# Patient Record
Sex: MALE | Race: WHITE | NOT HISPANIC OR LATINO
[De-identification: names, ages, dates, MRNs, and addresses within clinical notes are randomized per-mention and may not be internally consistent; named-entity substitution may affect disease eponyms.]

---

## 2021-05-05 ENCOUNTER — APPOINTMENT (OUTPATIENT)
Dept: CARDIOLOGY | Facility: CLINIC | Age: 51
End: 2021-05-05

## 2021-06-02 ENCOUNTER — APPOINTMENT (OUTPATIENT)
Dept: CARDIOLOGY | Facility: CLINIC | Age: 51
End: 2021-06-02
Payer: COMMERCIAL

## 2021-06-02 ENCOUNTER — NON-APPOINTMENT (OUTPATIENT)
Age: 51
End: 2021-06-02

## 2021-06-02 VITALS — HEART RATE: 105 BPM | WEIGHT: 315 LBS | BODY MASS INDEX: 44.1 KG/M2 | OXYGEN SATURATION: 98 % | HEIGHT: 71 IN

## 2021-06-02 VITALS — SYSTOLIC BLOOD PRESSURE: 138 MMHG | DIASTOLIC BLOOD PRESSURE: 98 MMHG

## 2021-06-02 PROCEDURE — 99072 ADDL SUPL MATRL&STAF TM PHE: CPT

## 2021-06-02 PROCEDURE — 99243 OFF/OP CNSLTJ NEW/EST LOW 30: CPT

## 2021-06-02 PROCEDURE — 93000 ELECTROCARDIOGRAM COMPLETE: CPT

## 2021-06-02 NOTE — DISCUSSION/SUMMARY
[FreeTextEntry1] : 51 year old man with HTN, persistent atrial fibrillation (on AC) presenting for first time to office after referal from primary cardiologist in setting of decompensated heart failure. \par \par \par HF- likely preserved given risk factors of atrial fibrillation, obesity, HTN however no TTE here. \par Patient euvolemic on exam today with JVP of 8. Counseled patient on relationship of HFpEF and metabolic risk factors as well as atrial fibrillation.\par -continue with torsemide 60 mg daily which patient is responding to \par -would like to obtain TTE however plan for patient to have EP evaluation with Dr. Fair with likely plan for eventual JUN/cardioversion. \par -based on JUN and heart function, will consider ischemic evaluation but would like to address atrial fibrillation first. \par -plan related to patient and discussed with Dr. Huddlseton \par \par Atrial fibrillation- persistent \par Chads VASC 2. No symptoms at this time but is tachycardiac in atrial fibrillation. \par Counseled patient on adverse outcomes of persistent long term a fib. He reports he has never had a cardioversion. \par -plan for EP evaluation within week, likely with plan for cardioversion and if not durable, than ablation. \par -plan discussed with patient at length\par \par Patient to return to office in 2 months for evaluation.

## 2021-06-02 NOTE — REASON FOR VISIT
[Symptom and Test Evaluation] : symptom and test evaluation [FreeTextEntry3] : Dr. Huddleston  [FreeTextEntry1] : Refered for recent decompensated heart failure admission

## 2021-06-02 NOTE — HISTORY OF PRESENT ILLNESS
[FreeTextEntry1] : Santosh Abraham is a 51 year old man with HTN, persistent atrial fibrillation (on AC) who presents for first time evaluation after recent complicated hospital stay. He states he was in his usual state of health until November 2020, which is when he felt URI symptoms. At that time, he noticed that his right testicle swollen and went to [facility]. It never resolved with conservative measures upon discharge from the ER. After his discharge, he states he had blood in the urine, went back to ER, and was readmitted to [facility]. Was found to be in decompensated heart failure at that time. Met a cardiologist Dr. Peng, cardiologist. Recommended Dr. Huddleston after hospital stay. \par Chest x-ray and blood work was done. Did TTE at [facility] but he is unaware of the results. \par No chest pain No shortness of breath. He feels that his energy level has increased since his hospital stay. 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 \par \par Medications\par - losartan \par -apixaban 5 mg \par -diltiazem 240 mg \par -metoprolol 100 mg daily\par -spironolactone 25 mg \par -torsemide 20 mg x 3 \par -losartan 25 mg daily \par  [Persistent Atrial Fibrillation] : persistent atrial fibrillation

## 2021-06-02 NOTE — PHYSICAL EXAM
[Well Developed] : well developed [Well Nourished] : well nourished [Ill-Appearing] : ill-appearing [Normal Conjunctiva] : normal conjunctiva [No Xanthelasma] : no xanthelasma [Normal Venous Pressure] : normal venous pressure [Normal S1, S2] : normal S1, S2 [No Murmur] : no murmur [No Edema] : no edema [Normal Radial B/L] : normal radial B/L

## 2021-06-07 ENCOUNTER — NON-APPOINTMENT (OUTPATIENT)
Age: 51
End: 2021-06-07

## 2021-06-07 ENCOUNTER — APPOINTMENT (OUTPATIENT)
Dept: ELECTROPHYSIOLOGY | Facility: CLINIC | Age: 51
End: 2021-06-07
Payer: COMMERCIAL

## 2021-06-07 VITALS
WEIGHT: 315 LBS | DIASTOLIC BLOOD PRESSURE: 81 MMHG | SYSTOLIC BLOOD PRESSURE: 127 MMHG | HEIGHT: 71 IN | HEART RATE: 89 BPM | BODY MASS INDEX: 44.1 KG/M2 | OXYGEN SATURATION: 97 %

## 2021-06-07 PROCEDURE — 99205 OFFICE O/P NEW HI 60 MIN: CPT

## 2021-06-07 PROCEDURE — 93000 ELECTROCARDIOGRAM COMPLETE: CPT

## 2021-06-07 PROCEDURE — 99072 ADDL SUPL MATRL&STAF TM PHE: CPT

## 2021-06-07 NOTE — DISCUSSION/SUMMARY
[FreeTextEntry1] : In summary, this is a 51 year old man with CHADSVASC 2 late persistent atrial fibrillation in the setting of morbid obesity, HTN and decompensated heart failure managed on high dose beta blockers and calcium channel blockers. Although he feels he is overtly asymptomatic, we are highly suspicious that he will feel significantly better in sinus rhythm. Options regarding management discussed, including rate control strategy with AV manju blocking agents, or rhythm control strategies employing antiarrhythmic drugs and/or catheter ablation were reviewed. We discussed the importance of maintaining sinus rhythm in the setting of LV dysfunction and recommend he undergo a rhythm control strategy. At this point, would suggest he be admitted for Sotalol initiation followed by DCCV on Sotalol. He does occasionally miss his doses of Eliquis and will require a JUN prior to cardioversion. We also discussed that he may undergo JUN/DCCV without Sotalol, however given the fact that he has likely been in persistent AF since at least 11/2020, the chance of success of the DCCV is low. \par \par We also discussed that overtime, if he can continue lifestyle modifications and lose a significant amount of weight so we can consider a more aggressive upfront management strategy employing an ablation procedure in an effort to reduce risk of worsening HF and subsequent hospitalizations.\par \par Routine blood work has been ordered today to evaluate thyroid function in the setting of AF and obtain baseline CBC/renal function should he decide to proceed with Sotalol initiation.\par  \par Mr. Abraham would like to consider his options and call our office with his decision of how he would like to proceed with management. \par \par Mr. Abraham appeared to understand the whole discussion and verbalized that all of his questions were answered to her satisfaction.\par \par Thank you for allowing me to be involved in the care of this pleasant woman. Please feel free to contact me with any questions.\par  \par \par

## 2021-06-07 NOTE — HISTORY OF PRESENT ILLNESS
[FreeTextEntry1] : Referring Physician: Dr. Alexi Huddleston and DR. Pramod Kimball\par \par Dear Khris Huddleston and Jigar,\par \par Mr. Abraham was seen in the [facility] today. For our records, please allow me to summarize the history and my findings.\par \par This pleasant 51 year old man has a cardiovascular history significant for tobacco use (quit 12-13 years ago) morbid obesity, HTN, and persistent atrial fibrillation. He states he was in his usual state of health until 11/2020 when he presented to [place] ER for one week of R testicular swelling. It unfortunately did not resolve with conservative measures upon discharge from the ER. After his discharge, he states he had blood in the urine, went back to ER, and was readmitted to [place]. At that time he was found to be in decompensated heart failure. AF was diagnosed at the time and he was initiated on oral anticoagulation. Reports he had lab work, CXR, and TTE but is unsure of the results. \par \par In retrospect, a few weeks prior to admission, he had a "bad" upper respiratory infection and reports feeling sluggish for a few weeks afterwards. \par \par He is currently maintained on Metoprolol tartrate 150mg BID, Diltiazem 240mg QD and Eliquis 5mg. \par \par He drinks a few glasses of wine daily. He is a former smoker (quit 12-13 years ago) and drinks one caffeinated cup of coffee per day. \par \par Reports good exercise tolerance and reports improved eating habits and some weight loss since hospitalization. He is able to walk 1.5-2miles per day without fatigue. He was told he needs to undergo a sleep study but has not yet scheduled this. Mr. Abraham denies any recent history of chest pain, shortness of breath, palpitations, dizziness, or syncope. \par

## 2021-06-08 LAB
ALBUMIN SERPL ELPH-MCNC: 4.7 G/DL
ALP BLD-CCNC: 81 U/L
ALT SERPL-CCNC: 36 U/L
ANION GAP SERPL CALC-SCNC: 13 MMOL/L
AST SERPL-CCNC: 33 U/L
BASOPHILS # BLD AUTO: 0.04 K/UL
BASOPHILS NFR BLD AUTO: 0.4 %
BILIRUB SERPL-MCNC: 0.7 MG/DL
BUN SERPL-MCNC: 31 MG/DL
CALCIUM SERPL-MCNC: 9.6 MG/DL
CHLORIDE SERPL-SCNC: 99 MMOL/L
CO2 SERPL-SCNC: 26 MMOL/L
CREAT SERPL-MCNC: 1.53 MG/DL
EOSINOPHIL # BLD AUTO: 0.14 K/UL
EOSINOPHIL NFR BLD AUTO: 1.5 %
GLUCOSE SERPL-MCNC: 112 MG/DL
HCT VFR BLD CALC: 48 %
HGB BLD-MCNC: 16.4 G/DL
IMM GRANULOCYTES NFR BLD AUTO: 0.6 %
LYMPHOCYTES # BLD AUTO: 1.15 K/UL
LYMPHOCYTES NFR BLD AUTO: 12 %
MAN DIFF?: NORMAL
MCHC RBC-ENTMCNC: 31.1 PG
MCHC RBC-ENTMCNC: 34.2 GM/DL
MCV RBC AUTO: 90.9 FL
MONOCYTES # BLD AUTO: 0.64 K/UL
MONOCYTES NFR BLD AUTO: 6.7 %
NEUTROPHILS # BLD AUTO: 7.54 K/UL
NEUTROPHILS NFR BLD AUTO: 78.8 %
PLATELET # BLD AUTO: 185 K/UL
POTASSIUM SERPL-SCNC: 4.6 MMOL/L
PROT SERPL-MCNC: 7.8 G/DL
RBC # BLD: 5.28 M/UL
RBC # FLD: 12.7 %
SODIUM SERPL-SCNC: 139 MMOL/L
TSH SERPL-ACNC: 2.23 UIU/ML
WBC # FLD AUTO: 9.57 K/UL

## 2022-10-18 ENCOUNTER — NON-APPOINTMENT (OUTPATIENT)
Age: 52
End: 2022-10-18

## 2022-10-18 DIAGNOSIS — Z86.39 PERSONAL HISTORY OF OTHER ENDOCRINE, NUTRITIONAL AND METABOLIC DISEASE: ICD-10-CM

## 2022-10-18 DIAGNOSIS — Z78.9 OTHER SPECIFIED HEALTH STATUS: ICD-10-CM

## 2022-10-18 DIAGNOSIS — Z87.891 PERSONAL HISTORY OF NICOTINE DEPENDENCE: ICD-10-CM

## 2022-10-18 DIAGNOSIS — Z82.49 FAMILY HISTORY OF ISCHEMIC HEART DISEASE AND OTHER DISEASES OF THE CIRCULATORY SYSTEM: ICD-10-CM

## 2022-10-18 DIAGNOSIS — Z83.3 FAMILY HISTORY OF DIABETES MELLITUS: ICD-10-CM

## 2022-12-08 RX ORDER — APIXABAN 5 MG/1
5 TABLET, FILM COATED ORAL
Refills: 0 | Status: DISCONTINUED | COMMUNITY
End: 2022-12-08

## 2023-01-12 ENCOUNTER — NON-APPOINTMENT (OUTPATIENT)
Age: 53
End: 2023-01-12

## 2023-01-12 ENCOUNTER — APPOINTMENT (OUTPATIENT)
Dept: CARDIOLOGY | Facility: CLINIC | Age: 53
End: 2023-01-12
Payer: COMMERCIAL

## 2023-01-12 VITALS
WEIGHT: 315 LBS | OXYGEN SATURATION: 98 % | DIASTOLIC BLOOD PRESSURE: 83 MMHG | HEART RATE: 71 BPM | TEMPERATURE: 98.1 F | SYSTOLIC BLOOD PRESSURE: 128 MMHG | HEIGHT: 71 IN | BODY MASS INDEX: 44.1 KG/M2

## 2023-01-12 PROCEDURE — 99213 OFFICE O/P EST LOW 20 MIN: CPT | Mod: 25

## 2023-01-12 PROCEDURE — 93000 ELECTROCARDIOGRAM COMPLETE: CPT

## 2023-01-12 RX ORDER — RIVAROXABAN 20 MG/1
20 TABLET, FILM COATED ORAL
Refills: 0 | Status: DISCONTINUED | COMMUNITY
End: 2023-01-12

## 2023-01-12 RX ORDER — MAGNESIUM OXIDE 241.3 MG/1000MG
400 TABLET ORAL TWICE DAILY
Refills: 0 | Status: DISCONTINUED | COMMUNITY
End: 2023-01-12

## 2023-01-12 NOTE — ASSESSMENT
[FreeTextEntry1] : 52 years old male with atrial fibrillation CHF marked obesity and sleep apnea comes to the office for follow-up.  Patient was advised to see Dr. Alvarado for pulmonary evaluation and evaluation for sleep apnea

## 2023-01-12 NOTE — REASON FOR VISIT
[Hyperlipidemia] : hyperlipidemia [Hypertension] : hypertension [Other: ____] : [unfilled] [FreeTextEntry1] : 52 years old male with arteriosclerotic heart disease atrial fibrillation CHF, obesity and sleep apnea comes to the office for routine follow up. denies chest pain-has had shortness of breath on mild-to-moderate exertion

## 2023-04-13 ENCOUNTER — APPOINTMENT (OUTPATIENT)
Dept: CARDIOLOGY | Facility: CLINIC | Age: 53
End: 2023-04-13

## 2023-07-07 ENCOUNTER — NON-APPOINTMENT (OUTPATIENT)
Age: 53
End: 2023-07-07

## 2023-07-07 ENCOUNTER — APPOINTMENT (OUTPATIENT)
Dept: CARDIOLOGY | Facility: CLINIC | Age: 53
End: 2023-07-07
Payer: COMMERCIAL

## 2023-07-07 VITALS
WEIGHT: 315 LBS | HEIGHT: 71 IN | SYSTOLIC BLOOD PRESSURE: 139 MMHG | BODY MASS INDEX: 44.1 KG/M2 | OXYGEN SATURATION: 95 % | DIASTOLIC BLOOD PRESSURE: 91 MMHG | HEART RATE: 79 BPM

## 2023-07-07 DIAGNOSIS — I48.19 OTHER PERSISTENT ATRIAL FIBRILLATION: ICD-10-CM

## 2023-07-07 DIAGNOSIS — I25.10 ATHEROSCLEROTIC HEART DISEASE OF NATIVE CORONARY ARTERY W/OUT ANGINA PECTORIS: ICD-10-CM

## 2023-07-07 DIAGNOSIS — Z00.00 ENCOUNTER FOR GENERAL ADULT MEDICAL EXAMINATION W/OUT ABNORMAL FINDINGS: ICD-10-CM

## 2023-07-07 PROCEDURE — 99213 OFFICE O/P EST LOW 20 MIN: CPT | Mod: 25

## 2023-07-07 PROCEDURE — 93000 ELECTROCARDIOGRAM COMPLETE: CPT

## 2023-07-07 NOTE — ASSESSMENT
[FreeTextEntry1] : Patient's medications reviewed..  Patient continue previous medication no changes are made.  Patient was referred to wound care center for chronic wound in the left leg

## 2023-07-07 NOTE — REASON FOR VISIT
[Arrhythmia/ECG Abnorrmalities] : arrhythmia/ECG abnormalities [Hypertension] : hypertension [Other: ____] : [unfilled] [FreeTextEntry1] : 53 years old male with atrial fibrillation CHF hypertension obesity with sleep apnea comes to the office for routine follow-up

## 2023-07-14 ENCOUNTER — OUTPATIENT (OUTPATIENT)
Dept: OUTPATIENT SERVICES | Facility: HOSPITAL | Age: 53
LOS: 1 days | Discharge: HOME | End: 2023-07-14
Payer: COMMERCIAL

## 2023-07-14 ENCOUNTER — APPOINTMENT (OUTPATIENT)
Dept: WOUND CARE | Facility: HOSPITAL | Age: 53
End: 2023-07-14
Payer: COMMERCIAL

## 2023-07-14 VITALS
OXYGEN SATURATION: 97 % | RESPIRATION RATE: 22 BRPM | HEART RATE: 100 BPM | WEIGHT: 315 LBS | TEMPERATURE: 98.5 F | HEIGHT: 71 IN | DIASTOLIC BLOOD PRESSURE: 85 MMHG | SYSTOLIC BLOOD PRESSURE: 139 MMHG | BODY MASS INDEX: 44.1 KG/M2

## 2023-07-14 DIAGNOSIS — L97.801 NON-PRESSURE CHRONIC ULCER OF OTHER PART OF UNSPECIFIED LOWER LEG LIMITED TO BREAKDOWN OF SKIN: ICD-10-CM

## 2023-07-14 DIAGNOSIS — I83.90 ASYMPTOMATIC VARICOSE VEINS OF UNSPECIFIED LOWER EXTREMITY: ICD-10-CM

## 2023-07-14 DIAGNOSIS — Z86.39 PERSONAL HISTORY OF OTHER ENDOCRINE, NUTRITIONAL AND METABOLIC DISEASE: ICD-10-CM

## 2023-07-14 DIAGNOSIS — Z78.9 OTHER SPECIFIED HEALTH STATUS: ICD-10-CM

## 2023-07-14 PROCEDURE — G0463: CPT

## 2023-07-14 PROCEDURE — 99203 OFFICE O/P NEW LOW 30 MIN: CPT

## 2023-07-14 RX ORDER — POTASSIUM CHLORIDE 1500 MG/1
20 TABLET, EXTENDED RELEASE ORAL DAILY
Qty: 90 | Refills: 0 | Status: DISCONTINUED | COMMUNITY
Start: 1900-01-01 | End: 2023-07-14

## 2023-07-17 RX ORDER — FUROSEMIDE 20 MG/1
20 TABLET ORAL TWICE DAILY
Qty: 360 | Refills: 0 | Status: DISCONTINUED | COMMUNITY
End: 2023-07-17

## 2023-07-19 DIAGNOSIS — Z83.3 FAMILY HISTORY OF DIABETES MELLITUS: ICD-10-CM

## 2023-07-19 DIAGNOSIS — I48.19 OTHER PERSISTENT ATRIAL FIBRILLATION: ICD-10-CM

## 2023-07-19 DIAGNOSIS — G47.30 SLEEP APNEA, UNSPECIFIED: ICD-10-CM

## 2023-07-19 DIAGNOSIS — Z79.899 OTHER LONG TERM (CURRENT) DRUG THERAPY: ICD-10-CM

## 2023-07-19 DIAGNOSIS — Z79.01 LONG TERM (CURRENT) USE OF ANTICOAGULANTS: ICD-10-CM

## 2023-07-19 DIAGNOSIS — Z82.49 FAMILY HISTORY OF ISCHEMIC HEART DISEASE AND OTHER DISEASES OF THE CIRCULATORY SYSTEM: ICD-10-CM

## 2023-07-19 DIAGNOSIS — E66.01 MORBID (SEVERE) OBESITY DUE TO EXCESS CALORIES: ICD-10-CM

## 2023-07-19 DIAGNOSIS — I11.0 HYPERTENSIVE HEART DISEASE WITH HEART FAILURE: ICD-10-CM

## 2023-07-19 DIAGNOSIS — I50.9 HEART FAILURE, UNSPECIFIED: ICD-10-CM

## 2023-07-19 DIAGNOSIS — L97.829 NON-PRESSURE CHRONIC ULCER OF OTHER PART OF LEFT LOWER LEG WITH UNSPECIFIED SEVERITY: ICD-10-CM

## 2023-07-19 DIAGNOSIS — I83.228 VARICOSE VEINS OF LEFT LOWER EXTREMITY WITH BOTH ULCER OF OTHER PART OF LOWER EXTREMITY AND INFLAMMATION: ICD-10-CM

## 2023-07-21 ENCOUNTER — OUTPATIENT (OUTPATIENT)
Dept: OUTPATIENT SERVICES | Facility: HOSPITAL | Age: 53
LOS: 1 days | Discharge: HOME | End: 2023-07-21
Payer: COMMERCIAL

## 2023-07-21 ENCOUNTER — APPOINTMENT (OUTPATIENT)
Dept: WOUND CARE | Facility: HOSPITAL | Age: 53
End: 2023-07-21
Payer: COMMERCIAL

## 2023-07-21 VITALS
HEIGHT: 71 IN | DIASTOLIC BLOOD PRESSURE: 79 MMHG | TEMPERATURE: 98 F | WEIGHT: 315 LBS | OXYGEN SATURATION: 94 % | RESPIRATION RATE: 20 BRPM | SYSTOLIC BLOOD PRESSURE: 123 MMHG | HEART RATE: 90 BPM | BODY MASS INDEX: 44.1 KG/M2

## 2023-07-21 DIAGNOSIS — Z79.01 LONG TERM (CURRENT) USE OF ANTICOAGULANTS: ICD-10-CM

## 2023-07-21 DIAGNOSIS — I11.0 HYPERTENSIVE HEART DISEASE WITH HEART FAILURE: ICD-10-CM

## 2023-07-21 DIAGNOSIS — I50.9 HEART FAILURE, UNSPECIFIED: ICD-10-CM

## 2023-07-21 DIAGNOSIS — Z79.899 OTHER LONG TERM (CURRENT) DRUG THERAPY: ICD-10-CM

## 2023-07-21 DIAGNOSIS — I48.19 OTHER PERSISTENT ATRIAL FIBRILLATION: ICD-10-CM

## 2023-07-21 DIAGNOSIS — E66.01 MORBID (SEVERE) OBESITY DUE TO EXCESS CALORIES: ICD-10-CM

## 2023-07-21 DIAGNOSIS — Z82.49 FAMILY HISTORY OF ISCHEMIC HEART DISEASE AND OTHER DISEASES OF THE CIRCULATORY SYSTEM: ICD-10-CM

## 2023-07-21 DIAGNOSIS — Z83.3 FAMILY HISTORY OF DIABETES MELLITUS: ICD-10-CM

## 2023-07-21 DIAGNOSIS — L97.822 NON-PRESSURE CHRONIC ULCER OF OTHER PART OF LEFT LOWER LEG WITH FAT LAYER EXPOSED: ICD-10-CM

## 2023-07-21 DIAGNOSIS — L97.801 NON-PRESSURE CHRONIC ULCER OF OTHER PART OF UNSPECIFIED LOWER LEG LIMITED TO BREAKDOWN OF SKIN: ICD-10-CM

## 2023-07-21 DIAGNOSIS — G47.30 SLEEP APNEA, UNSPECIFIED: ICD-10-CM

## 2023-07-21 PROCEDURE — 99213 OFFICE O/P EST LOW 20 MIN: CPT

## 2023-07-21 PROCEDURE — G0463: CPT

## 2023-07-21 NOTE — VITALS
[Pain related to present condition?] : The patient's  pain is related to present condition. [Burning] : burning [] : No [de-identified] : 4/10 [FreeTextEntry3] : Left Leg wound sites [FreeTextEntry1] : Pain comes and goes [FreeTextEntry2] : anything [FreeTextEntry4] : rest, elevation [FreeTextEntry5] : Torsemide 40mg twice daily prescribed by cardiologist on Monday 7/17/23

## 2023-07-21 NOTE — REVIEW OF SYSTEMS
[Negative] : Heme/Lymph [FreeTextEntry2] : obesity [FreeTextEntry5] : ASHD,A Fib, CHF,Hypertension [FreeTextEntry6] : sleep apnea [de-identified] : left VSU [FreeTextEntry1] : varicose veins

## 2023-07-21 NOTE — ASSESSMENT
[Verbal] : Verbal [Demo] : Demo [Patient] : Patient [Good - alert, interested, motivated] : Good - alert, interested, motivated [Verbalizes knowledge/Understanding] : Verbalizes knowledge/understanding [Dressing changes] : dressing changes [Skin Care] : skin care [Signs and symptoms of infection] : sign and symptoms of infection [Nutrition] : nutrition [How and When to Call] : how and when to call [Patient responsibility to plan of care] : patient responsibility to plan of care [Stable] : stable [Home] : Home [Ambulatory] : Ambulatory [Not Applicable - Long Term Care/Home Health Agency] : Long Term Care/Home Health Agency: Not Applicable [] : No [FreeTextEntry2] : Infection Prevention\par Wound care and Dressing changes\par Promote and Restore optimal skin integrity\par Nutrition and Wound Healing \par Offloading and Pressure relief\par Protect and Guard wound site \par Maintain acceptable levels of Pain\par  [FreeTextEntry4] : MD assessed wound sites - \par No change in treatment plan at this time\par Patient with appt on 7/27/23 for vascular studies and consult with Dr. Freeman on 7/23/23.\par Follow up in one week

## 2023-07-21 NOTE — ASSESSMENT
[Verbal] : Verbal [Written] : Written [Demo] : Demo [Patient] : Patient [Good - alert, interested, motivated] : Good - alert, interested, motivated [Verbalizes knowledge/Understanding] : Verbalizes knowledge/understanding [Dressing changes] : dressing changes [Skin Care] : skin care [Signs and symptoms of infection] : sign and symptoms of infection [Venous Disease] : venous disease [Nutrition] : nutrition [How and When to Call] : how and when to call [Labs and Tests] : labs and tests [Off-loading] : off-loading [Compression Therapy] : compression therapy [Patient responsibility to plan of care] : patient responsibility to plan of care [] : Yes [Stable] : stable [Home] : Home [Ambulatory] : Ambulatory [FreeTextEntry2] : Infection prevention \par Wound care (dressing changes)\par Maintain optimal skin integrity to high pressure areas\par Compression therapy\par Nutrition and wound healing\par Elevation and low sodium compliance.\par Offloading the stress on skin structures and decreasing potential pathologic biomechanical influences.\par Weight reduction strategies.  [FreeTextEntry3] : Initial Visit [FreeTextEntry4] : Discussed weight reduction strategies \par Pt to see PCP after today's visit to discuss bariatric surgery Vs. oral medications to aid in weight loss. \par New Eval Protocol; Vascular studies ordered. Auth submitted. \par Vascular consult submitted. Pt aware to await phone call(s) from both.\par Supplies submitted\par F/U 1 Week

## 2023-07-21 NOTE — HISTORY OF PRESENT ILLNESS
[FreeTextEntry1] : BALDO BRADFORD is being seen for a initial nursing assessment visit. Pt presents to the wound care center with left lower leg " leaking sores" that has "become worse". Pt states the duration of wounds have been x 1-2 months.\par Pt has been cleaning the wounds with soap and water, and was treating the wounds with coconut oil but has not been treating the wound with that any longer. Patient accompanied by Self.

## 2023-07-21 NOTE — PLAN
[FreeTextEntry1] : left lower leg stasis dermatitis, VSU laterally and posteriorly\par AgAlginate,Xtrasorb,ABD,DD,QOD\par vascular studies venous and arterial ordered and to be done next Thursday\par vascular consult in 10 days\par lower leg elevation stressed\par patient to speak to his cardiologist about increasing diuretic\par 25 minutes spent in review,evaluation and treatment planning

## 2023-07-21 NOTE — PHYSICAL EXAM
[Abdominal Pad] : Abdominal Pad [4 x 4] : 4 x 4  [0] : left 0 [Ankle Swelling (On Exam)] : present [Ankle Swelling Bilaterally] : bilaterally  [Varicose Veins Of Lower Extremities] : bilaterally [] : bilaterally [Ankle Swelling On The Left] : moderate [Alert] : alert [Oriented to Person] : oriented to person [Oriented to Place] : oriented to place [Oriented to Time] : oriented to time [Calm] : calm [de-identified] : morbidly obese 54 Y/O white male in NAD [de-identified] : A Fib [de-identified] : left lower leg VSU with heavy serous drainage with inflamed stasis dermatitis [FreeTextEntry1] : Left Lateral Leg [FreeTextEntry2] : 21.4 [FreeTextEntry3] : 14.5 [FreeTextEntry4] : 0.1  - 0.2 [de-identified] : Large weeping serous [de-identified] : mild erythema [de-identified] : none [de-identified] : 100% [de-identified] : none [de-identified] : Alginate Ag [de-identified] : Mechanically cleansed with sterile gauze and normal saline 0.9%\par Dry Dressing\par \par \par CIRCULATION\par \par Dorsalis Pedis: R Palpable, Regular, 2+ L Palpable, Regular, 2+\par Posterior Tibialis: R Palpable, Regular, 2+ L Palpable, Regular, 2+\par Extremity Color: Pigmented\par Extremity Temperature: Warm\par Capillary Refill: < 3 seconds bilaterally\par Vascular studies ordered by Dr Mohit jeffers sheet submitted\par  [FreeTextEntry7] : Left Leg, Posterior [FreeTextEntry8] : 3.0 [FreeTextEntry9] : 3.9 [de-identified] : 0.1 [de-identified] : moderate serous [de-identified] : macerated [de-identified] : none [de-identified] : 100% [de-identified] : none [de-identified] : Alginate Ag [de-identified] : Mechanically cleansed with sterile gauze and normal saline 0.9%\par Dry Dressing\par  [TWNoteComboBox5] : No [TWNoteComboBox6] : Venous [de-identified] : No [de-identified] : other [de-identified] : None [de-identified] : 3x Weekly [de-identified] : Primary Dressing [de-identified] : No [de-identified] : Venous [de-identified] : No [de-identified] : other [de-identified] : None [de-identified] : 3x Weekly [de-identified] : Primary Dressing

## 2023-07-21 NOTE — REVIEW OF SYSTEMS
[Negative] : Heme/Lymph [FreeTextEntry2] : obesity [FreeTextEntry5] : ASHD,A Fib, CHF,Hypertension [FreeTextEntry6] : sleep apnea [de-identified] : left VSU [FreeTextEntry1] : varicose veins

## 2023-07-21 NOTE — PHYSICAL EXAM
[4 x 4] : 4 x 4  [0] : left 0 [Ankle Swelling (On Exam)] : present [Ankle Swelling Bilaterally] : bilaterally  [Varicose Veins Of Lower Extremities] : bilaterally [] : bilaterally [Ankle Swelling On The Left] : moderate [Alert] : alert [Oriented to Person] : oriented to person [Oriented to Place] : oriented to place [Oriented to Time] : oriented to time [Calm] : calm [de-identified] : morbidly obese 52 Y/O white male in NAD [de-identified] : A Fib [de-identified] : left lower leg VSU with heavy serous drainage with inflamed stasis dermatitis [FreeTextEntry1] : Left Lateral Leg [FreeTextEntry2] : 19.7 [FreeTextEntry3] : 14.0 [FreeTextEntry4] : 0.1 [de-identified] : Moderate serous [de-identified] : Alginate Ag [de-identified] : Mechanically cleansed with 0.9% Normal Saline\par  [FreeTextEntry7] : Left  Posterior Leg [FreeTextEntry8] : 3.0 [FreeTextEntry9] : 3.8 [de-identified] : 0.1 [de-identified] : Moderate serosanguinous [de-identified] : Alginate Ag [de-identified] : Mechanically cleansed with 0.9% Normal Saline\par  [TWNoteComboBox5] : No [TWNoteComboBox6] : Venous [de-identified] : No [de-identified] : Erythema [de-identified] : None [de-identified] : None [de-identified] : 100% [de-identified] : 3x Weekly [de-identified] : Primary Dressing [de-identified] : No [de-identified] : Venous [de-identified] : No [de-identified] : other [de-identified] : None [de-identified] : None [de-identified] : 100% [de-identified] : 3x Weekly [de-identified] : Primary Dressing

## 2023-07-21 NOTE — PLAN
[FreeTextEntry1] : left lower leg stasis dermatitis, VSU laterally and posteriorly\par AgAlginate,Xtrasorb,ABD,DD,QOD\par vascular studies venous and arterial ordered\par vascular consult\par lower leg elevation\par patient to speak to his cardiologist about increasing diuretic\par 35 minutes spent in review,evaluation and treatment planning

## 2023-07-21 NOTE — HISTORY OF PRESENT ILLNESS
[FreeTextEntry1] : BALDO BRADFORD is being seen for a initial nursing assessment visit. Pt presents to the wound care center with left lower leg " leaking sores" that has "become worse". Pt states the duration of wounds have been x 1-2 months.\par Pt has been cleaning the wounds with soap and water, and was treating the wounds with coconut oil but has not been treating the wound with that any longer. Patient accompanied by Self. \par 7/21/23 left lower leg ulcer improved,heel is stable. no signs of infection

## 2023-07-27 ENCOUNTER — OUTPATIENT (OUTPATIENT)
Dept: OUTPATIENT SERVICES | Facility: HOSPITAL | Age: 53
LOS: 1 days | End: 2023-07-27
Payer: COMMERCIAL

## 2023-07-27 DIAGNOSIS — L97.822 NON-PRESSURE CHRONIC ULCER OF OTHER PART OF LEFT LOWER LEG WITH FAT LAYER EXPOSED: ICD-10-CM

## 2023-07-27 PROCEDURE — 93970 EXTREMITY STUDY: CPT | Mod: 26

## 2023-07-27 PROCEDURE — 93970 EXTREMITY STUDY: CPT

## 2023-07-27 PROCEDURE — 93923 UPR/LXTR ART STDY 3+ LVLS: CPT | Mod: 26

## 2023-07-27 PROCEDURE — 93923 UPR/LXTR ART STDY 3+ LVLS: CPT

## 2023-07-28 ENCOUNTER — OUTPATIENT (OUTPATIENT)
Dept: OUTPATIENT SERVICES | Facility: HOSPITAL | Age: 53
LOS: 1 days | Discharge: HOME | End: 2023-07-28
Payer: COMMERCIAL

## 2023-07-28 ENCOUNTER — APPOINTMENT (OUTPATIENT)
Dept: WOUND CARE | Facility: HOSPITAL | Age: 53
End: 2023-07-28
Payer: COMMERCIAL

## 2023-07-28 VITALS
HEIGHT: 71 IN | BODY MASS INDEX: 44.1 KG/M2 | HEART RATE: 80 BPM | SYSTOLIC BLOOD PRESSURE: 104 MMHG | WEIGHT: 315 LBS | DIASTOLIC BLOOD PRESSURE: 74 MMHG | OXYGEN SATURATION: 94 % | TEMPERATURE: 98.8 F | RESPIRATION RATE: 20 BRPM

## 2023-07-28 DIAGNOSIS — L97.822 NON-PRESSURE CHRONIC ULCER OF OTHER PART OF LEFT LOWER LEG WITH FAT LAYER EXPOSED: ICD-10-CM

## 2023-07-28 PROCEDURE — 99213 OFFICE O/P EST LOW 20 MIN: CPT

## 2023-07-28 PROCEDURE — G0463: CPT

## 2023-07-31 ENCOUNTER — APPOINTMENT (OUTPATIENT)
Dept: WOUND CARE | Facility: HOSPITAL | Age: 53
End: 2023-07-31
Payer: COMMERCIAL

## 2023-07-31 ENCOUNTER — OUTPATIENT (OUTPATIENT)
Dept: OUTPATIENT SERVICES | Facility: HOSPITAL | Age: 53
LOS: 1 days | Discharge: HOME | End: 2023-07-31
Payer: COMMERCIAL

## 2023-07-31 VITALS
DIASTOLIC BLOOD PRESSURE: 85 MMHG | HEART RATE: 80 BPM | BODY MASS INDEX: 44.1 KG/M2 | TEMPERATURE: 99.1 F | SYSTOLIC BLOOD PRESSURE: 132 MMHG | HEIGHT: 71 IN | OXYGEN SATURATION: 95 % | RESPIRATION RATE: 20 BRPM | WEIGHT: 315 LBS

## 2023-07-31 DIAGNOSIS — I83.228 VARICOSE VEINS OF LEFT LOWER EXTREMITY WITH BOTH ULCER OF OTHER PART OF LOWER EXTREMITY AND INFLAMMATION: ICD-10-CM

## 2023-07-31 DIAGNOSIS — L97.822 NON-PRESSURE CHRONIC ULCER OF OTHER PART OF LEFT LOWER LEG WITH FAT LAYER EXPOSED: ICD-10-CM

## 2023-07-31 DIAGNOSIS — Z79.899 OTHER LONG TERM (CURRENT) DRUG THERAPY: ICD-10-CM

## 2023-07-31 DIAGNOSIS — Z82.49 FAMILY HISTORY OF ISCHEMIC HEART DISEASE AND OTHER DISEASES OF THE CIRCULATORY SYSTEM: ICD-10-CM

## 2023-07-31 DIAGNOSIS — Z86.39 PERSONAL HISTORY OF OTHER ENDOCRINE, NUTRITIONAL AND METABOLIC DISEASE: ICD-10-CM

## 2023-07-31 DIAGNOSIS — Z98.890 OTHER SPECIFIED POSTPROCEDURAL STATES: ICD-10-CM

## 2023-07-31 DIAGNOSIS — Z79.01 LONG TERM (CURRENT) USE OF ANTICOAGULANTS: ICD-10-CM

## 2023-07-31 DIAGNOSIS — Z83.3 FAMILY HISTORY OF DIABETES MELLITUS: ICD-10-CM

## 2023-07-31 PROCEDURE — G0463: CPT

## 2023-07-31 PROCEDURE — 99213 OFFICE O/P EST LOW 20 MIN: CPT

## 2023-07-31 NOTE — HISTORY OF PRESENT ILLNESS
[FreeTextEntry1] : . Pt presents to the wound care center with left lower leg " leaking sores" that has "become worse". Pt states the duration of wounds have been x 1-2 months.. He has hx of CHF and is taking diuretics. He denies any LE pain at night or during ambulation. He is working on his weight as well.

## 2023-07-31 NOTE — PLAN
[TextEntry] : I have reviewed tests with patient. I have spent a total of 20 minutes with patient.  Start ace compression. Follow up in 1 m. I would watch for now given improvement.

## 2023-07-31 NOTE — ASSESSMENT
[FreeTextEntry1] : Evidence of  PVD on ABIs but clinically perfused and wound almost healed.  Extensive comorbidities

## 2023-07-31 NOTE — PHYSICAL EXAM
[2+] : left 2+ [FreeTextEntry1] : strong biphasic signals. cap ref 2 sec [de-identified] : L lateral shin epithelialized wounds

## 2023-08-01 DIAGNOSIS — L97.822 NON-PRESSURE CHRONIC ULCER OF OTHER PART OF LEFT LOWER LEG WITH FAT LAYER EXPOSED: ICD-10-CM

## 2023-08-01 DIAGNOSIS — I11.0 HYPERTENSIVE HEART DISEASE WITH HEART FAILURE: ICD-10-CM

## 2023-08-01 DIAGNOSIS — Z82.49 FAMILY HISTORY OF ISCHEMIC HEART DISEASE AND OTHER DISEASES OF THE CIRCULATORY SYSTEM: ICD-10-CM

## 2023-08-01 DIAGNOSIS — G47.30 SLEEP APNEA, UNSPECIFIED: ICD-10-CM

## 2023-08-01 DIAGNOSIS — I50.9 HEART FAILURE, UNSPECIFIED: ICD-10-CM

## 2023-08-01 DIAGNOSIS — Z83.3 FAMILY HISTORY OF DIABETES MELLITUS: ICD-10-CM

## 2023-08-01 DIAGNOSIS — Z79.01 LONG TERM (CURRENT) USE OF ANTICOAGULANTS: ICD-10-CM

## 2023-08-01 DIAGNOSIS — Z79.899 OTHER LONG TERM (CURRENT) DRUG THERAPY: ICD-10-CM

## 2023-08-01 DIAGNOSIS — E66.01 MORBID (SEVERE) OBESITY DUE TO EXCESS CALORIES: ICD-10-CM

## 2023-08-01 DIAGNOSIS — I48.19 OTHER PERSISTENT ATRIAL FIBRILLATION: ICD-10-CM

## 2023-08-01 NOTE — REVIEW OF SYSTEMS
[Negative] : Heme/Lymph [FreeTextEntry2] : obesity [FreeTextEntry5] : ASHD,A Fib, CHF,Hypertension [FreeTextEntry6] : sleep apnea [de-identified] : left VSU [FreeTextEntry1] : varicose veins

## 2023-08-01 NOTE — ASSESSMENT
[Verbal] : Verbal [Written] : Written [Demo] : Demo [Patient] : Patient [Good - alert, interested, motivated] : Good - alert, interested, motivated [Verbalizes knowledge/Understanding] : Verbalizes knowledge/understanding [Dressing changes] : dressing changes [Skin Care] : skin care [Signs and symptoms of infection] : sign and symptoms of infection [Venous Disease] : venous disease [Nutrition] : nutrition [How and When to Call] : how and when to call [Off-loading] : off-loading [Compression Therapy] : compression therapy [Patient responsibility to plan of care] : patient responsibility to plan of care [] : Yes [Stable] : stable [Home] : Home [Ambulatory] : Ambulatory [FreeTextEntry2] : Infection prevention \par Wound care (dressing changes)\par Maintain optimal skin integrity to high pressure areas\par Compression therapy\par Nutrition and wound healing\par Elevation and low sodium compliance.\par Offloading the stress on skin structures and decreasing potential pathologic biomechanical influences.\par Weight reduction strategies. [FreeTextEntry4] : Venous & Arterial Studies reviewed. \telma Pt has an appt with Ren Sanchez on 7/31/23 \telma F/U 1 Week

## 2023-08-01 NOTE — PHYSICAL EXAM
[4 x 4] : 4 x 4  [Abdominal Pad] : Abdominal Pad [0] : left 0 [Ankle Swelling (On Exam)] : present [Ankle Swelling Bilaterally] : bilaterally  [Varicose Veins Of Lower Extremities] : bilaterally [] : bilaterally [Ankle Swelling On The Left] : moderate [Alert] : alert [Oriented to Person] : oriented to person [Oriented to Place] : oriented to place [Oriented to Time] : oriented to time [Calm] : calm [Skin Ulcer] : ulcer [de-identified] : morbidly obese 54 Y/O white male in NAD [de-identified] : A Fib [de-identified] : left lower leg VSU with heavy serous drainage with inflamed stasis dermatitis [FreeTextEntry1] : Left Lateral Leg [FreeTextEntry2] : 18.4 [FreeTextEntry3] : 13.5 [FreeTextEntry4] : 0.1 [de-identified] : Moderate serous [de-identified] : Alginate Ag [de-identified] : Mechanically cleansed with sterile gauze and normal saline 0.9%\par Dry Dressing\par  [FreeTextEntry7] : Left  Posterior Leg [FreeTextEntry8] : 2.7 [FreeTextEntry9] : 3.1 [de-identified] : 0.1 [de-identified] : moderate serous [de-identified] : Alginate Ag [de-identified] : Mechanically cleansed with sterile gauze and normal saline 0.9%\par Dry Dressing\par  [TWNoteComboBox5] : No [TWNoteComboBox6] : Venous [de-identified] : No [de-identified] : Erythema [de-identified] : None [de-identified] : None [de-identified] : 100% [de-identified] : 3x Weekly [de-identified] : Primary Dressing [de-identified] : No [de-identified] : Venous [de-identified] : No [de-identified] : other [de-identified] : None [de-identified] : None [de-identified] : 100% [de-identified] : 3x Weekly [de-identified] : Primary Dressing

## 2023-08-01 NOTE — PLAN
[FreeTextEntry1] : left lower leg stasis dermatitis, VSU laterally and posteriorly AgAlginate,Xtrasorb,ABD,DD,QOD Vascular consult lower leg elevation stressed patient to speak to his cardiologist about possibly increasing diuretic 20 minutes spent in review,evaluation and treatment planning

## 2023-08-01 NOTE — HISTORY OF PRESENT ILLNESS
[FreeTextEntry1] : BALDO BRADFORD is being seen for a initial nursing assessment visit. Pt presents to the wound care center with left lower leg " leaking sores" that has "become worse". Pt states the duration of wounds have been x 1-2 months. Pt has been cleaning the wounds with soap and water, and was treating the wounds with coconut oil but has not been treating the wound with that any longer. Patient accompanied by Self.  7/21/23 left lower leg ulcer improved,heel is stable. no signs of infection 7/28/23 left lower leg ulcer stable at this time, healing well

## 2023-08-08 ENCOUNTER — OUTPATIENT (OUTPATIENT)
Dept: OUTPATIENT SERVICES | Facility: HOSPITAL | Age: 53
LOS: 1 days | Discharge: HOME | End: 2023-08-08
Payer: COMMERCIAL

## 2023-08-08 ENCOUNTER — APPOINTMENT (OUTPATIENT)
Dept: WOUND CARE | Facility: HOSPITAL | Age: 53
End: 2023-08-08
Payer: COMMERCIAL

## 2023-08-08 VITALS
RESPIRATION RATE: 18 BRPM | HEIGHT: 71 IN | WEIGHT: 315 LBS | BODY MASS INDEX: 44.1 KG/M2 | TEMPERATURE: 98.2 F | DIASTOLIC BLOOD PRESSURE: 77 MMHG | OXYGEN SATURATION: 95 % | SYSTOLIC BLOOD PRESSURE: 145 MMHG | HEART RATE: 93 BPM

## 2023-08-08 DIAGNOSIS — L97.822 NON-PRESSURE CHRONIC ULCER OF OTHER PART OF LEFT LOWER LEG WITH FAT LAYER EXPOSED: ICD-10-CM

## 2023-08-08 PROCEDURE — G0463: CPT

## 2023-08-08 PROCEDURE — 99213 OFFICE O/P EST LOW 20 MIN: CPT

## 2023-08-08 NOTE — PHYSICAL EXAM
[4 x 4] : 4 x 4  [0] : left 0 [Ankle Swelling (On Exam)] : present [Ankle Swelling Bilaterally] : bilaterally  [Varicose Veins Of Lower Extremities] : bilaterally [] : present [Ankle Swelling On The Left] : moderate [Skin Ulcer] : ulcer [Alert] : alert [Oriented to Person] : oriented to person [Oriented to Place] : oriented to place [Oriented to Time] : oriented to time [Calm] : calm [de-identified] : morbidly obese 52 Y/O white male in NAD [de-identified] : A Fib [de-identified] : left lower leg VSU down to skin, subcutaneous tissue, fat with stasis dermatitis [FreeTextEntry1] : Left Lateral Leg  [FreeTextEntry2] : 0.6 [FreeTextEntry3] : 0.4 [FreeTextEntry4] : 0.1 [de-identified] : serosanguineous [de-identified] : Pt expressed comfort post ACE application. No neurovascular deficits noted. [de-identified] : Silver Alginate [de-identified] : Mechanically Cleansed with Normal Saline and Sterile Gauze [FreeTextEntry7] :  Left Posterior Leg   [FreeTextEntry8] : 1.8 [FreeTextEntry9] : 2.5 [de-identified] : 0.1 [de-identified] : serosanguineous [de-identified] : Pt expressed comfort post ACE application. No neurovascular deficits noted. [de-identified] : Silver Alginate [de-identified] : Mechanically Cleansed with Normal Saline and Sterile Gauze [TWNoteComboBox4] : Small [TWNoteComboBox5] : No [TWNoteComboBox6] : Other [de-identified] : No [de-identified] : Normal [de-identified] : None [de-identified] : None [de-identified] : 100% [de-identified] : No [de-identified] : Ace wraps [de-identified] : 3x Weekly [de-identified] : Primary Dressing [de-identified] : Small [de-identified] : No [de-identified] : Other [de-identified] : No [de-identified] : Normal [de-identified] : None [de-identified] : None [de-identified] : 100% [de-identified] : No [de-identified] : Ace wraps [de-identified] : 3x Weekly [de-identified] : Primary Dressing

## 2023-08-08 NOTE — REVIEW OF SYSTEMS
[Negative] : Heme/Lymph [FreeTextEntry2] : obesity [FreeTextEntry5] : ASHD,A Fib, CHF,Hypertension [FreeTextEntry6] : sleep apnea [de-identified] : left VSU [FreeTextEntry1] : varicose veins

## 2023-08-08 NOTE — HISTORY OF PRESENT ILLNESS
[FreeTextEntry1] : BALDO BRADFORD is being seen for a initial nursing assessment visit. Pt presents to the wound care center with left lower leg " leaking sores" that has "become worse". Pt states the duration of wounds have been x 1-2 months. Pt has been cleaning the wounds with soap and water, and was treating the wounds with coconut oil but has not been treating the wound with that any longer. Patient accompanied by Self.  7/21/23 left lower leg ulcer improved,heel is stable. no signs of infection 7/28/23 left lower leg ulcer stable at this time, healing well 8/8/23 left lower leg ulcer stable, almost closed

## 2023-08-08 NOTE — PLAN
[FreeTextEntry1] : left lower leg stasis dermatitis, VSU laterally and posteriorly AgAlginate,Xtrasorb,ABD,DD,QOD lower leg elevation stressed 20 minutes spent in review,evaluation and treatment planning Patient to follow-up in 2 weeks.

## 2023-08-08 NOTE — ASSESSMENT
[Verbal] : Verbal [Demo] : Demo [Patient] : Patient [Good - alert, interested, motivated] : Good - alert, interested, motivated [Verbalizes knowledge/Understanding] : Verbalizes knowledge/understanding [Dressing changes] : dressing changes [Pressure relief] : pressure relief [Signs and symptoms of infection] : sign and symptoms of infection [How and When to Call] : how and when to call [Off-loading] : off-loading [Compression Therapy] : compression therapy [Patient responsibility to plan of care] : patient responsibility to plan of care [Stable] : stable [Home] : Home [Ambulatory] : Ambulatory [FreeTextEntry2] : Promote Skin Integrity Infection Prevention  Localized Wound Care  Offloading and Pressure Relief  Dressing Changes  Compression Compliance  F/U 2 weeks  [FreeTextEntry4] : DPM educated patient to continue compression compliance as directed by Dr. Lopes (Vascular Surgery). Pt verbalized understanding of teaching.  Patient to return in 2 weeks for assessment.

## 2023-08-10 DIAGNOSIS — I83.228 VARICOSE VEINS OF LEFT LOWER EXTREMITY WITH BOTH ULCER OF OTHER PART OF LOWER EXTREMITY AND INFLAMMATION: ICD-10-CM

## 2023-08-10 DIAGNOSIS — E66.01 MORBID (SEVERE) OBESITY DUE TO EXCESS CALORIES: ICD-10-CM

## 2023-08-10 DIAGNOSIS — I11.0 HYPERTENSIVE HEART DISEASE WITH HEART FAILURE: ICD-10-CM

## 2023-08-10 DIAGNOSIS — Z79.899 OTHER LONG TERM (CURRENT) DRUG THERAPY: ICD-10-CM

## 2023-08-10 DIAGNOSIS — Z83.3 FAMILY HISTORY OF DIABETES MELLITUS: ICD-10-CM

## 2023-08-10 DIAGNOSIS — Z82.49 FAMILY HISTORY OF ISCHEMIC HEART DISEASE AND OTHER DISEASES OF THE CIRCULATORY SYSTEM: ICD-10-CM

## 2023-08-10 DIAGNOSIS — Z79.01 LONG TERM (CURRENT) USE OF ANTICOAGULANTS: ICD-10-CM

## 2023-08-10 DIAGNOSIS — I48.19 OTHER PERSISTENT ATRIAL FIBRILLATION: ICD-10-CM

## 2023-08-10 DIAGNOSIS — L97.822 NON-PRESSURE CHRONIC ULCER OF OTHER PART OF LEFT LOWER LEG WITH FAT LAYER EXPOSED: ICD-10-CM

## 2023-08-10 DIAGNOSIS — I73.9 PERIPHERAL VASCULAR DISEASE, UNSPECIFIED: ICD-10-CM

## 2023-08-10 DIAGNOSIS — I50.9 HEART FAILURE, UNSPECIFIED: ICD-10-CM

## 2023-08-10 DIAGNOSIS — G47.30 SLEEP APNEA, UNSPECIFIED: ICD-10-CM

## 2023-08-10 RX ORDER — POTASSIUM CHLORIDE 1500 MG/1
20 TABLET, FILM COATED, EXTENDED RELEASE ORAL
Qty: 90 | Refills: 1 | Status: ACTIVE | COMMUNITY
Start: 2023-08-10 | End: 1900-01-01

## 2023-08-16 ENCOUNTER — APPOINTMENT (OUTPATIENT)
Dept: WOUND CARE | Facility: HOSPITAL | Age: 53
End: 2023-08-16

## 2023-08-25 ENCOUNTER — APPOINTMENT (OUTPATIENT)
Dept: WOUND CARE | Facility: HOSPITAL | Age: 53
End: 2023-08-25

## 2023-08-28 ENCOUNTER — APPOINTMENT (OUTPATIENT)
Dept: WOUND CARE | Facility: HOSPITAL | Age: 53
End: 2023-08-28
Payer: COMMERCIAL

## 2023-08-28 ENCOUNTER — OUTPATIENT (OUTPATIENT)
Dept: OUTPATIENT SERVICES | Facility: HOSPITAL | Age: 53
LOS: 1 days | Discharge: HOME | End: 2023-08-28
Payer: COMMERCIAL

## 2023-08-28 VITALS
TEMPERATURE: 99.1 F | RESPIRATION RATE: 20 BRPM | OXYGEN SATURATION: 95 % | BODY MASS INDEX: 44.1 KG/M2 | HEART RATE: 81 BPM | WEIGHT: 315 LBS | HEIGHT: 71 IN | DIASTOLIC BLOOD PRESSURE: 86 MMHG | SYSTOLIC BLOOD PRESSURE: 135 MMHG

## 2023-08-28 DIAGNOSIS — I83.228 VARICOSE VEINS OF LEFT LOWER EXTREMITY WITH BOTH ULCER OF OTHER PART OF LOWER EXTREMITY AND INFLAMMATION: ICD-10-CM

## 2023-08-28 DIAGNOSIS — L97.822 NON-PRESSURE CHRONIC ULCER OF OTHER PART OF LEFT LOWER LEG WITH FAT LAYER EXPOSED: ICD-10-CM

## 2023-08-28 DIAGNOSIS — I73.9 PERIPHERAL VASCULAR DISEASE, UNSPECIFIED: ICD-10-CM

## 2023-08-28 PROCEDURE — G0463: CPT

## 2023-08-28 PROCEDURE — 99213 OFFICE O/P EST LOW 20 MIN: CPT

## 2023-08-28 NOTE — ASSESSMENT
[FreeTextEntry1] : Evidence of  PVD on ABIs but clinically perfused and wound almost healed.  Extensive comorbidities.   Today he only has scabs.

## 2023-08-28 NOTE — HISTORY OF PRESENT ILLNESS
[FreeTextEntry1] : . Pt presents to the wound care center with left lower leg " leaking sores" that has "become worse". Pt states the duration of wounds have been x 1-2 months.. He has hx of CHF and is taking diuretics. He denies any LE pain at night or during ambulation. He is working on his weight as well.  [de-identified] : Doing very well. LLE is improved. Scabs only. Compression with Ace. Today for wound eval given low SOFIA.

## 2023-08-28 NOTE — PLAN
[TextEntry] : I have reviewed tests with patient. I have spent a total of 20 minutes with patient.  Transition to stockings. No follow up needed.

## 2023-08-28 NOTE — PHYSICAL EXAM
[2+] : left 2+ [FreeTextEntry1] : strong biphasic signals. cap ref 2 sec [de-identified] : L posterior-lateral scabs no open wounds

## 2023-08-29 DIAGNOSIS — L97.822 NON-PRESSURE CHRONIC ULCER OF OTHER PART OF LEFT LOWER LEG WITH FAT LAYER EXPOSED: ICD-10-CM

## 2023-08-29 DIAGNOSIS — Z83.3 FAMILY HISTORY OF DIABETES MELLITUS: ICD-10-CM

## 2023-08-29 DIAGNOSIS — Z86.39 PERSONAL HISTORY OF OTHER ENDOCRINE, NUTRITIONAL AND METABOLIC DISEASE: ICD-10-CM

## 2023-08-29 DIAGNOSIS — Z98.890 OTHER SPECIFIED POSTPROCEDURAL STATES: ICD-10-CM

## 2023-08-29 DIAGNOSIS — Z79.01 LONG TERM (CURRENT) USE OF ANTICOAGULANTS: ICD-10-CM

## 2023-08-29 DIAGNOSIS — Z82.49 FAMILY HISTORY OF ISCHEMIC HEART DISEASE AND OTHER DISEASES OF THE CIRCULATORY SYSTEM: ICD-10-CM

## 2023-08-29 DIAGNOSIS — Z79.899 OTHER LONG TERM (CURRENT) DRUG THERAPY: ICD-10-CM

## 2023-08-29 DIAGNOSIS — I73.9 PERIPHERAL VASCULAR DISEASE, UNSPECIFIED: ICD-10-CM

## 2023-08-29 DIAGNOSIS — I83.228 VARICOSE VEINS OF LEFT LOWER EXTREMITY WITH BOTH ULCER OF OTHER PART OF LOWER EXTREMITY AND INFLAMMATION: ICD-10-CM

## 2023-08-29 DIAGNOSIS — Z87.891 PERSONAL HISTORY OF NICOTINE DEPENDENCE: ICD-10-CM

## 2023-10-10 LAB
ALBUMIN SERPL ELPH-MCNC: 4.2 G/DL
ALP BLD-CCNC: 68 U/L
ALT SERPL-CCNC: 29 U/L
ANION GAP SERPL CALC-SCNC: 9 MMOL/L
AST SERPL-CCNC: 25 U/L
BILIRUB SERPL-MCNC: 0.4 MG/DL
BUN SERPL-MCNC: 22 MG/DL
CALCIUM SERPL-MCNC: 9.3 MG/DL
CHLORIDE SERPL-SCNC: 103 MMOL/L
CHOLEST SERPL-MCNC: 142 MG/DL
CO2 SERPL-SCNC: 26 MMOL/L
CREAT SERPL-MCNC: 1.25 MG/DL
EGFR: 70 ML/MIN/1.73M2
GLUCOSE SERPL-MCNC: 108 MG/DL
HDLC SERPL-MCNC: 39 MG/DL
LDLC SERPL CALC-MCNC: 85 MG/DL
NONHDLC SERPL-MCNC: 103 MG/DL
POTASSIUM SERPL-SCNC: 4.9 MMOL/L
PROT SERPL-MCNC: 7.3 G/DL
SODIUM SERPL-SCNC: 138 MMOL/L
T4 SERPL-MCNC: 6.3 UG/DL
TRIGL SERPL-MCNC: 97 MG/DL
TSH SERPL-ACNC: 2.42 UIU/ML

## 2023-10-12 ENCOUNTER — NON-APPOINTMENT (OUTPATIENT)
Age: 53
End: 2023-10-12

## 2023-10-12 ENCOUNTER — APPOINTMENT (OUTPATIENT)
Dept: CARDIOLOGY | Facility: CLINIC | Age: 53
End: 2023-10-12
Payer: COMMERCIAL

## 2023-10-12 VITALS
TEMPERATURE: 98.3 F | DIASTOLIC BLOOD PRESSURE: 72 MMHG | HEIGHT: 72 IN | WEIGHT: 315 LBS | SYSTOLIC BLOOD PRESSURE: 100 MMHG | BODY MASS INDEX: 42.66 KG/M2 | HEART RATE: 78 BPM | OXYGEN SATURATION: 96 %

## 2023-10-12 DIAGNOSIS — I48.91 UNSPECIFIED ATRIAL FIBRILLATION: ICD-10-CM

## 2023-10-12 DIAGNOSIS — E66.9 OBESITY, UNSPECIFIED: ICD-10-CM

## 2023-10-12 DIAGNOSIS — G47.30 SLEEP APNEA, UNSPECIFIED: ICD-10-CM

## 2023-10-12 DIAGNOSIS — I10 ESSENTIAL (PRIMARY) HYPERTENSION: ICD-10-CM

## 2023-10-12 PROCEDURE — 93000 ELECTROCARDIOGRAM COMPLETE: CPT

## 2023-10-12 PROCEDURE — 99213 OFFICE O/P EST LOW 20 MIN: CPT | Mod: 25

## 2023-10-12 RX ORDER — TIRZEPATIDE 10 MG/.5ML
10 INJECTION, SOLUTION SUBCUTANEOUS
Qty: 90 | Refills: 0 | Status: ACTIVE | COMMUNITY
Start: 2023-07-14 | End: 1900-01-01

## 2023-10-23 ENCOUNTER — RX RENEWAL (OUTPATIENT)
Age: 53
End: 2023-10-23

## 2023-10-27 ENCOUNTER — RX CHANGE (OUTPATIENT)
Age: 53
End: 2023-10-27

## 2023-10-27 DIAGNOSIS — R73.03 PREDIABETES.: ICD-10-CM

## 2023-10-27 DIAGNOSIS — I50.9 HEART FAILURE, UNSPECIFIED: ICD-10-CM

## 2023-11-27 ENCOUNTER — RX CHANGE (OUTPATIENT)
Age: 53
End: 2023-11-27

## 2023-12-22 ENCOUNTER — RX RENEWAL (OUTPATIENT)
Age: 53
End: 2023-12-22

## 2023-12-22 DIAGNOSIS — E87.6 HYPOKALEMIA: ICD-10-CM

## 2024-01-11 ENCOUNTER — APPOINTMENT (OUTPATIENT)
Dept: CARDIOLOGY | Facility: CLINIC | Age: 54
End: 2024-01-11

## 2024-03-01 RX ORDER — SPIRONOLACTONE 25 MG/1
25 TABLET ORAL DAILY
Qty: 90 | Refills: 1 | Status: ACTIVE | COMMUNITY
Start: 1900-01-01 | End: 1900-01-01

## 2024-03-04 RX ORDER — LOSARTAN POTASSIUM 25 MG/1
25 TABLET, FILM COATED ORAL DAILY
Qty: 1 | Refills: 1 | Status: ACTIVE | COMMUNITY
Start: 1900-01-01 | End: 1900-01-01

## 2024-03-15 RX ORDER — APIXABAN 5 MG/1
5 TABLET, FILM COATED ORAL
Qty: 180 | Refills: 1 | Status: ACTIVE | COMMUNITY
Start: 2022-12-27 | End: 1900-01-01

## 2024-03-27 ENCOUNTER — RX CHANGE (OUTPATIENT)
Age: 54
End: 2024-03-27

## 2024-04-01 ENCOUNTER — RX RENEWAL (OUTPATIENT)
Age: 54
End: 2024-04-01

## 2024-04-04 RX ORDER — DILTIAZEM HYDROCHLORIDE 300 MG/1
300 CAPSULE, EXTENDED RELEASE ORAL
Qty: 90 | Refills: 1 | Status: ACTIVE | COMMUNITY
Start: 1900-01-01 | End: 1900-01-01

## 2024-04-29 ENCOUNTER — RX CHANGE (OUTPATIENT)
Age: 54
End: 2024-04-29

## 2024-04-30 RX ORDER — TORSEMIDE 20 MG/1
20 TABLET ORAL
Qty: 360 | Refills: 0 | Status: ACTIVE | COMMUNITY
Start: 2023-07-14 | End: 1900-01-01

## 2024-04-30 RX ORDER — METOPROLOL TARTRATE 100 MG/1
100 TABLET, FILM COATED ORAL
Qty: 180 | Refills: 0 | Status: ACTIVE | COMMUNITY
Start: 1900-01-01 | End: 1900-01-01

## 2024-06-03 ENCOUNTER — RX RENEWAL (OUTPATIENT)
Age: 54
End: 2024-06-03

## 2024-06-03 RX ORDER — POTASSIUM CHLORIDE 1500 MG/1
20 TABLET, EXTENDED RELEASE ORAL
Qty: 30 | Refills: 2 | Status: ACTIVE | COMMUNITY
Start: 2023-12-22 | End: 1900-01-01

## 2024-07-02 ENCOUNTER — APPOINTMENT (OUTPATIENT)
Dept: CARDIOLOGY | Facility: CLINIC | Age: 54
End: 2024-07-02

## 2024-07-23 ENCOUNTER — APPOINTMENT (OUTPATIENT)
Dept: CARDIOLOGY | Facility: CLINIC | Age: 54
End: 2024-07-23
Payer: COMMERCIAL

## 2024-07-23 ENCOUNTER — NON-APPOINTMENT (OUTPATIENT)
Age: 54
End: 2024-07-23

## 2024-07-23 VITALS
DIASTOLIC BLOOD PRESSURE: 79 MMHG | BODY MASS INDEX: 42.66 KG/M2 | HEIGHT: 72 IN | TEMPERATURE: 98.4 F | SYSTOLIC BLOOD PRESSURE: 132 MMHG | WEIGHT: 315 LBS | OXYGEN SATURATION: 95 % | HEART RATE: 96 BPM | RESPIRATION RATE: 18 BRPM

## 2024-07-23 DIAGNOSIS — E87.6 HYPOKALEMIA: ICD-10-CM

## 2024-07-23 DIAGNOSIS — I50.9 HEART FAILURE, UNSPECIFIED: ICD-10-CM

## 2024-07-23 DIAGNOSIS — G47.30 SLEEP APNEA, UNSPECIFIED: ICD-10-CM

## 2024-07-23 DIAGNOSIS — Z00.00 ENCOUNTER FOR GENERAL ADULT MEDICAL EXAMINATION W/OUT ABNORMAL FINDINGS: ICD-10-CM

## 2024-07-23 DIAGNOSIS — I10 ESSENTIAL (PRIMARY) HYPERTENSION: ICD-10-CM

## 2024-07-23 DIAGNOSIS — I48.91 UNSPECIFIED ATRIAL FIBRILLATION: ICD-10-CM

## 2024-07-23 PROCEDURE — 99213 OFFICE O/P EST LOW 20 MIN: CPT | Mod: 25

## 2024-07-23 PROCEDURE — 93000 ELECTROCARDIOGRAM COMPLETE: CPT

## 2024-07-23 NOTE — ASSESSMENT
[FreeTextEntry1] : Patient's medications reviewed.  Patient will continue present medication.  Patient was advised to see pulmonary specialist for management of sleep apnea

## 2024-07-23 NOTE — REASON FOR VISIT
[Arrhythmia/ECG Abnorrmalities] : arrhythmia/ECG abnormalities [Hypertension] : hypertension [Other: ____] : [unfilled] [FreeTextEntry1] : 54 years old male with atrial fibrillation, CHF, hypertension, marked obesity, and sleep apnea comes to the office for routine follow-up

## 2024-07-30 ENCOUNTER — RX RENEWAL (OUTPATIENT)
Age: 54
End: 2024-07-30

## 2024-08-28 ENCOUNTER — RX RENEWAL (OUTPATIENT)
Age: 54
End: 2024-08-28

## 2024-09-04 ENCOUNTER — RX RENEWAL (OUTPATIENT)
Age: 54
End: 2024-09-04

## 2024-09-30 ENCOUNTER — RX RENEWAL (OUTPATIENT)
Age: 54
End: 2024-09-30

## 2024-10-24 ENCOUNTER — APPOINTMENT (OUTPATIENT)
Dept: CARDIOLOGY | Facility: CLINIC | Age: 54
End: 2024-10-24

## 2024-10-30 ENCOUNTER — RX RENEWAL (OUTPATIENT)
Age: 54
End: 2024-10-30

## 2024-11-18 ENCOUNTER — APPOINTMENT (OUTPATIENT)
Dept: WOUND CARE | Facility: HOSPITAL | Age: 54
End: 2024-11-18

## 2024-11-22 ENCOUNTER — RX RENEWAL (OUTPATIENT)
Age: 54
End: 2024-11-22

## 2024-11-25 ENCOUNTER — APPOINTMENT (OUTPATIENT)
Dept: WOUND CARE | Facility: HOSPITAL | Age: 54
End: 2024-11-25

## 2024-12-17 ENCOUNTER — RX CHANGE (OUTPATIENT)
Age: 54
End: 2024-12-17

## 2024-12-17 RX ORDER — POTASSIUM CHLORIDE 1500 MG/1
20 TABLET, FILM COATED, EXTENDED RELEASE ORAL
Qty: 90 | Refills: 2 | Status: ACTIVE | COMMUNITY
Start: 1900-01-01 | End: 1900-01-01

## 2024-12-23 ENCOUNTER — RX RENEWAL (OUTPATIENT)
Age: 54
End: 2024-12-23

## 2025-01-27 ENCOUNTER — RX CHANGE (OUTPATIENT)
Age: 55
End: 2025-01-27

## 2025-01-27 ENCOUNTER — RX RENEWAL (OUTPATIENT)
Age: 55
End: 2025-01-27

## 2025-01-28 ENCOUNTER — RX RENEWAL (OUTPATIENT)
Age: 55
End: 2025-01-28

## 2025-03-03 ENCOUNTER — RX RENEWAL (OUTPATIENT)
Age: 55
End: 2025-03-03

## 2025-03-27 ENCOUNTER — APPOINTMENT (OUTPATIENT)
Dept: CARDIOLOGY | Facility: CLINIC | Age: 55
End: 2025-03-27
Payer: COMMERCIAL

## 2025-03-27 ENCOUNTER — NON-APPOINTMENT (OUTPATIENT)
Age: 55
End: 2025-03-27

## 2025-03-27 VITALS
HEART RATE: 95 BPM | WEIGHT: 315 LBS | BODY MASS INDEX: 42.66 KG/M2 | SYSTOLIC BLOOD PRESSURE: 112 MMHG | OXYGEN SATURATION: 95 % | DIASTOLIC BLOOD PRESSURE: 72 MMHG | TEMPERATURE: 97.2 F | HEIGHT: 72 IN

## 2025-03-27 DIAGNOSIS — L97.822 NON-PRESSURE CHRONIC ULCER OF OTHER PART OF LEFT LOWER LEG WITH FAT LAYER EXPOSED: ICD-10-CM

## 2025-03-27 DIAGNOSIS — E66.9 OBESITY, UNSPECIFIED: ICD-10-CM

## 2025-03-27 DIAGNOSIS — I48.91 UNSPECIFIED ATRIAL FIBRILLATION: ICD-10-CM

## 2025-03-27 DIAGNOSIS — G47.30 SLEEP APNEA, UNSPECIFIED: ICD-10-CM

## 2025-03-27 DIAGNOSIS — E87.6 HYPOKALEMIA: ICD-10-CM

## 2025-03-27 DIAGNOSIS — I50.9 HEART FAILURE, UNSPECIFIED: ICD-10-CM

## 2025-03-27 DIAGNOSIS — I10 ESSENTIAL (PRIMARY) HYPERTENSION: ICD-10-CM

## 2025-03-27 PROCEDURE — 99213 OFFICE O/P EST LOW 20 MIN: CPT

## 2025-03-27 PROCEDURE — 93000 ELECTROCARDIOGRAM COMPLETE: CPT

## 2025-03-27 RX ORDER — TIRZEPATIDE 2.5 MG/.5ML
2.5 INJECTION, SOLUTION SUBCUTANEOUS
Qty: 4 | Refills: 0 | Status: ACTIVE | COMMUNITY
Start: 2025-03-27 | End: 1900-01-01

## 2025-03-31 ENCOUNTER — RX RENEWAL (OUTPATIENT)
Age: 55
End: 2025-03-31

## 2025-04-01 ENCOUNTER — RX RENEWAL (OUTPATIENT)
Age: 55
End: 2025-04-01

## 2025-04-07 ENCOUNTER — RX RENEWAL (OUTPATIENT)
Age: 55
End: 2025-04-07

## 2025-04-24 ENCOUNTER — RX RENEWAL (OUTPATIENT)
Age: 55
End: 2025-04-24

## 2025-06-03 ENCOUNTER — RX RENEWAL (OUTPATIENT)
Age: 55
End: 2025-06-03

## 2025-06-30 ENCOUNTER — RX CHANGE (OUTPATIENT)
Age: 55
End: 2025-06-30

## 2025-07-02 ENCOUNTER — RX RENEWAL (OUTPATIENT)
Age: 55
End: 2025-07-02

## 2025-07-17 ENCOUNTER — RX RENEWAL (OUTPATIENT)
Age: 55
End: 2025-07-17

## 2025-07-24 ENCOUNTER — APPOINTMENT (OUTPATIENT)
Dept: CARDIOLOGY | Facility: CLINIC | Age: 55
End: 2025-07-24